# Patient Record
Sex: MALE | Race: WHITE
[De-identification: names, ages, dates, MRNs, and addresses within clinical notes are randomized per-mention and may not be internally consistent; named-entity substitution may affect disease eponyms.]

---

## 2018-10-15 ENCOUNTER — HOSPITAL ENCOUNTER (EMERGENCY)
Dept: HOSPITAL 80 - FED | Age: 63
Discharge: HOME | End: 2018-10-15
Payer: COMMERCIAL

## 2018-10-15 VITALS — DIASTOLIC BLOOD PRESSURE: 85 MMHG | SYSTOLIC BLOOD PRESSURE: 125 MMHG

## 2018-10-15 DIAGNOSIS — E86.9: ICD-10-CM

## 2018-10-15 DIAGNOSIS — R42: Primary | ICD-10-CM

## 2018-10-15 DIAGNOSIS — R73.09: ICD-10-CM

## 2018-10-15 LAB — PLATELET # BLD: 267 10^3/UL (ref 150–400)

## 2018-10-15 NOTE — EDPHY
H & P


Time Seen by Provider: 10/15/18 07:47


Constitutional: 


 Initial Vital Signs











Temperature (C)  36.5 C   10/15/18 07:49


 


Heart Rate  91   10/15/18 07:49


 


Respiratory Rate  18   10/15/18 07:49


 


Blood Pressure  125/71 H  10/15/18 07:49


 


O2 Sat (%)  96   10/15/18 07:49








 











O2 Delivery Mode               Room Air














Allergies/Adverse Reactions: 


 





Penicillins Allergy (Verified 10/15/18 07:55)


 








Home Medications: 














 Medication  Instructions  Recorded


 


Anastrozole  10/15/18


 


Hydrochlorothiazide 12.5 mg PO 10/15/18


 


Lisinopril [Zestril 40 mg (*)] 40 mg PO DAILY 10/15/18














Medical Decision Making





- Diagnostics


Imaging: Discussed imaging studies w/ On call Radiologist, I viewed and 

interpreted images myself


ED Course/Re-evaluation: 





CHIEF COMPLAINT:  "I'm not feeling well"





HISTORY OF PRESENT ILLNESS:  The patient is a 64 y/o male arriving via EMS 

complaining of vague malaise for the last week or so. He is visiting from the 

[facility] and notes he has had minor intermittent headaches since arriving 

here. Around 02:00 this morning, 6 hours ago, he woke feeling lightheaded, weak 

"all over," and shaky "like my muscles don't have the strength to do what they 

need to do." He is reluctant to open his eyes during assessment and says he's 

tired but closing his eyes may help his [id number] dizziness and 

lightheadedness. He complains of an associated dry tongue and just generally 

feels "out of the ordinary." He denies fever, chills, dyspnea, cough, chest pain

, chest pressure, abdominal pain, vomiting, diarrhea. Prehospital BGL was 215 

and the patient denies eating this morning or any history of diabetes, though 

he is "on supplements to maintain my blood sugar level." No current flu 

vaccination. 





REVIEW OF SYSTEMS:  





A comprehensive 10 system review of systems is otherwise negative aside from 

elements mentioned in the history of present illness and medical decision 

making.





PHYSICAL EXAM:  





HR, BP, O2 Sat, RR.  Temp noted


General Appearance:  Alert, well hydrated, appropriate, and non-toxic appearing.


Head:  Atraumatic without scalp tenderness or obvious injury


Eyes:  Pupils equal, round, reactive to light and accommodation, EOMI, no trauma

, no injection.


Nose:  Atraumatic, no rhinorrhea, clear.


Throat:  There is no erythema or exudates, no lesions, normal tonsils, mucus 

membranes moist.


Neck:  Supple, nontender, no lymphadenopathy.


Respiratory:  No retractions, no distress, no wheezes, and no accessory muscle 

use.  Lungs are clear to auscultation bilaterally.


Cardiovascular:  Regular rate and rhythm, no murmurs, rubs, or gallops. Good 

capillary refill all extremities.


Gastrointestinal:  Abdomen is soft, nontender, non-distended, no masses, no 

rebound, no guarding, no peritoneal signs.


Musculoskeletal:  Normal active ROM of all extremities, atraumatic.


Neurological:  Alert, appropriate, and interactive.  The patient has non-focal 

cranial nerves, motor, sensory, and cerebellar exam.


Skin:  No rashes, good turgor, no nodules on palpation.





Past medical history: on supplements for BGL


Past surgical history: noncontributory


Family history: noncontributory


Social history: From the [place] area. Is a  and flew here. 





DIAGNOSTICS/PROCEDURES/CRITICAL CARE TIME:  





The 12 lead EKG was interpreted by myself. Sinus mechanism with left axis 

deviation. See hard copy and/or "tracemaster" electronic copy for 

interpretation.





Brain MRI: negative for acute findings





DIFFERENTIAL DIAGNOSIS:   The differential diagnosis for the patient's symptoms 

included but was not limited to peripheral and central causes of vertigo, 

orthostatic causes including dehydration, cardiogenic and neurogenic causes, 

and blood loss.





MEDICAL DECISION MAKING:  





This is a 64 y/o male who presents with a 1-week history of vague malaise that 

became more acute early this morning with lightheadedness, global weakness, and 

shakiness. He also describes some dizziness and is reluctant to open his eyes. 

Prehospital BGL was elevated at 215 and he denies history of diabetes though he 

is taking supplements to manage his BGL. Exam is nonfocal. Suspect vertigo or 

infection as most likely causes for his vague symptoms. Plan for IV, labs, EKG. 





WBC elevated at 13 with neutrophil shift, indicating likely infection. This 

could be causing his elevated BGL, which can also be contributing to his 

symptoms. He continues to feel poor on reassessment. Plan for 30mg IV Toradol, 

4mg IV Zofran, 1L IV NS, 25mg PO Meclizine, and 10 unit insulin bolus.





Reassessed patient. He feels improved, but still off. He says, "when I blink 

its brighter in the middle" and he feels he's "not my normal steady" when 

walking to the bathroom. Recommend brain MRI to further assess for cerebellar 

abnormality. 





Repeat . Brain MRI is negative for acute findings. Flu swab negative.





Reassessed patient and discussed findings. He appears much more comfortable 

than at initial presentation. I've not identified specifically what is causing 

his symptoms, but I suspect it's early signs of a viral illness. He feels 

comfortable being discharged at this time. Return precautions and follow up 

instructions discussed. 





- Data Points


Laboratory Results: 


 Laboratory Results





 10/15/18 08:02 





 10/15/18 08:02 





 











  10/15/18 10/15/18 10/15/18





  09:37 08:40 08:09


 


WBC      





    


 


RBC      





    


 


Hgb      





    


 


POC Hgb      16.7 gm/dL gm/dL





     (13.7-17.5) 


 


Hct      





    


 


POC Hct      49 % %





     (40-51) 


 


MCV      





    


 


MCH      





    


 


MCHC      





    


 


RDW      





    


 


Plt Count      





    


 


MPV      





    


 


Neut % (Auto)      





    


 


Lymph % (Auto)      





    


 


Mono % (Auto)      





    


 


Eos % (Auto)      





    


 


Baso % (Auto)      





    


 


Nucleat RBC Rel Count      





    


 


Absolute Neuts (auto)      





    


 


Absolute Lymphs (auto)      





    


 


Absolute Monos (auto)      





    


 


Absolute Eos (auto)      





    


 


Absolute Basos (auto)      





    


 


Absolute Nucleated RBC      





    


 


Immature Gran %      





    


 


Immature Gran #      





    


 


POC Sodium      143 mEq/L mEq/L





     (135-145) 


 


Sodium      





    


 


POC Potassium      3.4 mEq/L mEq/L





     (3.3-5.0) 


 


Potassium      





    


 


POC Chloride      104 mEq/L mEq/L





     () 


 


Chloride      





    


 


Carbon Dioxide      





    


 


Anion Gap      





    


 


POC BUN      23 mg/dL mg/dL





     (7-23) 


 


BUN      





    


 


Creatinine      





    


 


POC Creatinine      1.0 mg/dL mg/dL





     (0.7-1.3) 


 


Estimated GFR      





    


 


Glucose      





    


 


POC Glucose  123 mg/dL H mg/dL    197 mg/dL H mg/dL





   ()    () 


 


Calcium      





    


 


POC Troponin I      





    


 


Nasal Influenza A PCR    NEGATIVE FOR FLU A   





    (NEGATIVE)  


 


Nasal Influenza B PCR    NEGATIVE FOR FLU B   





    (NEGATIVE)  














  10/15/18 10/15/18 10/15/18





  08:06 08:02 08:02


 


WBC      13.05 10^3/uL H 10^3/uL





     (3.80-9.50) 


 


RBC      5.22 10^6/uL 10^6/uL





     (4.40-6.38) 


 


Hgb      16.9 g/dL g/dL





     (13.7-17.5) 


 


POC Hgb      





    


 


Hct      46.4 % %





     (40.0-51.0) 


 


POC Hct      





    


 


MCV      88.9 fL fL





     (81.5-99.8) 


 


MCH      32.4 pg pg





     (27.9-34.1) 


 


MCHC      36.4 g/dL g/dL





     (32.4-36.7) 


 


RDW      13.5 % %





     (11.5-15.2) 


 


Plt Count      267 10^3/uL 10^3/uL





     (150-400) 


 


MPV      9.8 fL fL





     (8.7-11.7) 


 


Neut % (Auto)      88.5 % H %





     (39.3-74.2) 


 


Lymph % (Auto)      6.4 % L %





     (15.0-45.0) 


 


Mono % (Auto)      4.4 % L %





     (4.5-13.0) 


 


Eos % (Auto)      0.1 % L %





     (0.6-7.6) 


 


Baso % (Auto)      0.2 % L %





     (0.3-1.7) 


 


Nucleat RBC Rel Count      0.0 % %





     (0.0-0.2) 


 


Absolute Neuts (auto)      11.56 10^3/uL H 10^3/uL





     (1.70-6.50) 


 


Absolute Lymphs (auto)      0.83 10^3/uL L 10^3/uL





     (1.00-3.00) 


 


Absolute Monos (auto)      0.57 10^3/uL 10^3/uL





     (0.30-0.80) 


 


Absolute Eos (auto)      0.01 10^3/uL L 10^3/uL





     (0.03-0.40) 


 


Absolute Basos (auto)      0.03 10^3/uL 10^3/uL





     (0.02-0.10) 


 


Absolute Nucleated RBC      0.00 10^3/uL 10^3/uL





     (0-0.01) 


 


Immature Gran %      0.4 % %





     (0.0-1.1) 


 


Immature Gran #      0.05 10^3/uL 10^3/uL





     (0.00-0.10) 


 


POC Sodium      





    


 


Sodium    140 mEq/L mEq/L  





    (135-145)  


 


POC Potassium      





    


 


Potassium    3.7 mEq/L mEq/L  





    (3.3-5.0)  


 


POC Chloride      





    


 


Chloride    106 mEq/L mEq/L  





    ()  


 


Carbon Dioxide    22 mEq/l mEq/l  





    (22-31)  


 


Anion Gap    12 mEq/L mEq/L  





    (6-14)  


 


POC BUN      





    


 


BUN    24 mg/dL H mg/dL  





    (7-23)  


 


Creatinine    0.9 mg/dL mg/dL  





    (0.7-1.3)  


 


POC Creatinine      





    


 


Estimated GFR    > 60   





    


 


Glucose    195 mg/dL H mg/dL  





    ()  


 


POC Glucose      





    


 


Calcium    9.7 mg/dL mg/dL  





    (8.5-10.4)  


 


POC Troponin I  0.03 ng/mL ng/mL    





   (0.00-0.08)   


 


Nasal Influenza A PCR      





    


 


Nasal Influenza B PCR      





    











Medications Given: 


 








Discontinued Medications





Sodium Chloride (Ns)  500 mls @ 0 mls/hr IV EDNOW ONE; Wide Open


   PRN Reason: Protocol


   Stop: 10/15/18 07:53


   Last Admin: 10/15/18 08:03 Dose:  500 mls


Sodium Chloride (Ns)  1,000 mls @ 0 mls/hr IV EDNOW ONE; Wide Open


   PRN Reason: Protocol


   Stop: 10/15/18 08:38


   Last Admin: 10/15/18 08:45 Dose:  1,000 mls


Insulin Human Regular (Humulin R)  10 unit SC EDNOW ONE


   Stop: 10/15/18 08:39


   Last Admin: 10/15/18 08:52 Dose:  10 units


Ketorolac Tromethamine (Toradol)  30 mg IVP EDNOW ONE


   Stop: 10/15/18 08:38


   Last Admin: 10/15/18 08:46 Dose:  30 mg


Meclizine HCl (Meclizine Hcl)  12.5 mg PO EDNOW ONE


   Stop: 10/15/18 08:38


   Last Admin: 10/15/18 08:47 Dose:  12.5 mg





Point of Care Test Results: 


 Chemistry











  10/15/18 10/15/18 10/15/18





  09:37 08:09 08:06


 


POC Sodium    143 mEq/L mEq/L  





    (135-145)  


 


POC Potassium    3.4 mEq/L mEq/L  





    (3.3-5.0)  


 


POC Chloride    104 mEq/L mEq/L  





    ()  


 


POC BUN    23 mg/dL mg/dL  





    (7-23)  


 


POC Creatinine    1.0 mg/dL mg/dL  





    (0.7-1.3)  


 


POC Glucose  123 mg/dL H mg/dL  197 mg/dL H mg/dL  





   ()   ()  


 


POC Troponin I      0.03 ng/mL ng/mL





     (0.00-0.08) 








 ISTAT H&H











  10/15/18





  08:09


 


POC Hgb  16.7 gm/dL gm/dL





   (13.7-17.5) 


 


POC Hct  49 % %





   (40-51) 














Departure





- Departure


Disposition: Home, Routine, Self-Care


Clinical Impression: 


 Blood glucose elevated, Lightheaded





Condition: Good


Instructions:  Lightheadedness (ED), How to Check your Blood Sugar (ED)


Additional Instructions: 


Follow up with your primary care provider upon your return home to recheck your 

elevated blood sugar level. It's possible it was elevated today due to illness, 

but you need to follow up with your PCP either way. 





Return to the ED for severe headache, weakness or numbness on one side of your 

body, uncontrollable vomiting, or other worsening of condition. 


Referrals: 


Sonali Miranda MD [Medical Doctor] - As per Instructions


Report Scribed for: Errol Hansen


Report Scribed by: Maggie Whitmore


Date of Report: 10/15/18


Time of Report: 07:58

## 2018-10-16 NOTE — CPEKG
Test Reason : OPEN

Blood Pressure : ***/*** mmHG

Vent. Rate : 083 BPM     Atrial Rate : 083 BPM

   P-R Int : 161 ms          QRS Dur : 108 ms

    QT Int : 380 ms       P-R-T Axes : 041 -43 017 degrees

   QTc Int : 447 ms

 

Sinus rhythm

Left axis deviation

 

Confirmed by Errol Hansen ([id number]) on 10/16/2018 1:09:11 PM

 

Referred By:             Confirmed By:Errol Hansen